# Patient Record
Sex: FEMALE | Race: WHITE | NOT HISPANIC OR LATINO
[De-identification: names, ages, dates, MRNs, and addresses within clinical notes are randomized per-mention and may not be internally consistent; named-entity substitution may affect disease eponyms.]

---

## 2020-03-16 PROBLEM — Z00.00 ENCOUNTER FOR PREVENTIVE HEALTH EXAMINATION: Status: ACTIVE | Noted: 2020-03-16

## 2020-03-20 ENCOUNTER — APPOINTMENT (OUTPATIENT)
Dept: PAIN MANAGEMENT | Facility: CLINIC | Age: 75
End: 2020-03-20
Payer: COMMERCIAL

## 2020-03-20 VITALS
HEIGHT: 64 IN | BODY MASS INDEX: 35.34 KG/M2 | SYSTOLIC BLOOD PRESSURE: 132 MMHG | DIASTOLIC BLOOD PRESSURE: 70 MMHG | WEIGHT: 207 LBS

## 2020-03-20 PROCEDURE — 99204 OFFICE O/P NEW MOD 45 MIN: CPT

## 2020-03-20 RX ORDER — CHLORTHALIDONE 25 MG/1
25 TABLET ORAL
Qty: 30 | Refills: 0 | Status: ACTIVE | COMMUNITY
Start: 2019-11-26

## 2020-03-20 RX ORDER — ASPIRIN 81 MG
81 TABLET, DELAYED RELEASE (ENTERIC COATED) ORAL
Refills: 0 | Status: ACTIVE | COMMUNITY

## 2020-03-20 RX ORDER — GABAPENTIN 300 MG/1
300 CAPSULE ORAL
Qty: 30 | Refills: 1 | Status: ACTIVE | COMMUNITY
Start: 2020-03-20 | End: 1900-01-01

## 2020-03-20 RX ORDER — IRBESARTAN 150 MG/1
150 TABLET ORAL
Qty: 30 | Refills: 0 | Status: ACTIVE | COMMUNITY
Start: 2019-11-26

## 2020-03-20 RX ORDER — ACETAMINOPHEN 325 MG/1
TABLET, FILM COATED ORAL
Refills: 0 | Status: ACTIVE | COMMUNITY

## 2020-03-20 RX ORDER — LETROZOLE TABLETS 2.5 MG/1
2.5 TABLET, FILM COATED ORAL
Qty: 30 | Refills: 0 | Status: ACTIVE | COMMUNITY
Start: 2020-02-04

## 2020-03-20 NOTE — PHYSICAL EXAM
[Normal muscle bulk without asymmetry] : normal muscle bulk without asymmetry [Spinous Process Tenderness] : spinous process tenderness [Facet Tenderness] : facet tenderness [Spine: Flexion to ___ degrees, without pain] : spine: flexion to [unfilled] degrees, without pain [] : Motor: [NL] : normal and symmetric bilaterally [de-identified] : Constitutional: Normal, well developed, no acute distress\par Eyes: Symmetric, External structures \par Oropharynx: Lips normal, symmetric, no external lesions appreciated\par Respiratory: Non-labored breathing, no audible wheezes\par Cardiac: Pulse palpated, no tachycardia\par Vascular: No cyanosis appreciated, no edema in bilateral lower extremities\par GI: Nondistended, no jaundice appreciated\par Neurovascular: CN2-12 grossly intact, Alert and oriented\par MSK: Normal muscle bulk, 5/5 Motor strength B/L in LE\par \par

## 2020-03-20 NOTE — REASON FOR VISIT
[Initial Consultation] : an initial pain management consultation [FreeTextEntry2] : referred by Dr. Carvajal for evaluation of back pain

## 2020-03-20 NOTE — CONSULT LETTER
[Dear  ___] : Dear  [unfilled], [Consult Letter:] : I had the pleasure of evaluating your patient, [unfilled]. [Please see my note below.] : Please see my note below. [Consult Closing:] : Thank you very much for allowing me to participate in the care of this patient.  If you have any questions, please do not hesitate to contact me. [Sincerely,] : Sincerely, [FreeTextEntry3] : \par SEAMUS Hand DO\par Director, Pain Management Center\par  of Anesthesiology\par [facility] [employer] [facility]\par \par \par

## 2020-03-20 NOTE — HISTORY OF PRESENT ILLNESS
[Back Pain] : back pain [Sharp] : sharp [Shooting] : shooting [Electric] : electric [Standing] : standing [Bending] : bending [Medications] : medications [8] : 3. What number best describes how, during the past week, pain has interfered with your general activity? 8/10 pain [FreeTextEntry1] : HPI\par \par Ms. MONSE JEAN is a 75 year F with pmhx osteopenia, breast ca on letrozole, bilateral TKR, on asa 81mg for primary prophylaxis, presents with bilateral mid back pain that radiates to bilateral subcostal region, intermittent. that started 2 months ago after tripping over the cat and fall on her back.  Pain is worse at night when turning at night. denies any worsening numbness, weakness, bowel/bladder dysfunction\par \par Previous and current pain medications/doses/effects:\par \par Tylenol with mild improvement\par \par Previous Pain Treatments:\par \par PT with mild improvement\par \par Previous Pain Injections:\par \par n/a\par \par Previous Diagnostic Studies/Images:\par \par n/a\par \par  [de-identified] : grpping [FreeTextEntry3] : turning  [FreeTextEntry2] : 24

## 2020-03-20 NOTE — ASSESSMENT
[FreeTextEntry1] : Mid and lower back pain in patient with hx of osteopenia and breast ca after fall that is refractory to conservative treatments, will obtain MRI TS and LS w wo IVC to evaluate for pathology - concern for vcf\par \par may consider repeat PT pending intervention\par \par gabapentin 300mg nightly\par \par \par The above diagnosis and treatment plan is medically reasonable and necessary based on the patient encounter.\par Opiod contract signed,\par There were no barriers to communication.\par Informed patient that I would be available for any additional questions.\par Patient was instructed to call with any worsening symptoms including severe pain, new numbness/weakness, or changes in the bowel/bladder function. \par Regarding opiate medication to manage pain. I had a detailed discussion with the patient regarding the risks of long-term opioid use, including the potential for medication side effects, hyperaglesia, endocrine dysfunction, addiction, tolerance, constipation, among others. Discussed relevant risks. \par \par Discussed role of nsaids in pain management and all relevant risks, if patient is continuing to require after 4 weeks the patient should f/u for alternative treatment. \par \par Instructed patient to maintain pain diary to monitor pain level, mobility, and function.\par \par The referring provider was informed of the above diagnosis and treatment plan.\par

## 2020-03-20 NOTE — REVIEW OF SYSTEMS
[Urinary Frequency] : urinary frequency [Back Pain] : back pain [Joint Pain] : joint pain [Negative] : Heme/Lymph

## 2020-03-25 ENCOUNTER — TRANSCRIPTION ENCOUNTER (OUTPATIENT)
Age: 75
End: 2020-03-25

## 2020-03-26 ENCOUNTER — RESULT REVIEW (OUTPATIENT)
Age: 75
End: 2020-03-26

## 2020-03-31 ENCOUNTER — APPOINTMENT (OUTPATIENT)
Dept: PAIN MANAGEMENT | Facility: CLINIC | Age: 75
End: 2020-03-31
Payer: COMMERCIAL

## 2020-03-31 ENCOUNTER — RECORD ABSTRACTING (OUTPATIENT)
Age: 75
End: 2020-03-31

## 2020-03-31 DIAGNOSIS — Z78.9 OTHER SPECIFIED HEALTH STATUS: ICD-10-CM

## 2020-03-31 DIAGNOSIS — M19.90 UNSPECIFIED OSTEOARTHRITIS, UNSPECIFIED SITE: ICD-10-CM

## 2020-03-31 DIAGNOSIS — K29.70 GASTRITIS, UNSPECIFIED, W/OUT BLEEDING: ICD-10-CM

## 2020-03-31 DIAGNOSIS — Z87.2 PERSONAL HISTORY OF DISEASES OF THE SKIN AND SUBCUTANEOUS TISSUE: ICD-10-CM

## 2020-03-31 DIAGNOSIS — Z82.0 FAMILY HISTORY OF EPILEPSY AND OTHER DISEASES OF THE NERVOUS SYSTEM: ICD-10-CM

## 2020-03-31 DIAGNOSIS — Z80.3 FAMILY HISTORY OF MALIGNANT NEOPLASM OF BREAST: ICD-10-CM

## 2020-03-31 DIAGNOSIS — E78.5 HYPERLIPIDEMIA, UNSPECIFIED: ICD-10-CM

## 2020-03-31 DIAGNOSIS — I10 ESSENTIAL (PRIMARY) HYPERTENSION: ICD-10-CM

## 2020-03-31 DIAGNOSIS — Z80.0 FAMILY HISTORY OF MALIGNANT NEOPLASM OF DIGESTIVE ORGANS: ICD-10-CM

## 2020-03-31 DIAGNOSIS — Z85.3 PERSONAL HISTORY OF MALIGNANT NEOPLASM OF BREAST: ICD-10-CM

## 2020-03-31 PROCEDURE — 99213 OFFICE O/P EST LOW 20 MIN: CPT

## 2020-03-31 RX ORDER — OMEPRAZOLE 20 MG/1
20 CAPSULE, DELAYED RELEASE ORAL
Qty: 30 | Refills: 0 | Status: ACTIVE | COMMUNITY
Start: 2019-12-17

## 2020-03-31 RX ORDER — AZITHROMYCIN 250 MG/1
250 TABLET, FILM COATED ORAL
Qty: 6 | Refills: 0 | Status: ACTIVE | COMMUNITY
Start: 2019-10-03

## 2020-03-31 RX ORDER — ASPIRIN 325 MG/1
325 TABLET, FILM COATED ORAL TWICE DAILY
Refills: 0 | Status: ACTIVE | COMMUNITY

## 2020-03-31 RX ORDER — PANTOPRAZOLE 40 MG/1
40 TABLET, DELAYED RELEASE ORAL DAILY
Refills: 0 | Status: ACTIVE | COMMUNITY

## 2020-03-31 RX ORDER — METAXALONE 800 MG/1
800 TABLET ORAL
Qty: 30 | Refills: 0 | Status: ACTIVE | COMMUNITY
Start: 2019-12-17

## 2020-03-31 RX ORDER — MELOXICAM 7.5 MG/1
7.5 TABLET ORAL
Qty: 30 | Refills: 0 | Status: ACTIVE | COMMUNITY
Start: 2019-12-19

## 2020-03-31 NOTE — PHYSICAL EXAM
[Normal muscle bulk without asymmetry] : normal muscle bulk without asymmetry [Normal] : Normal affect

## 2020-03-31 NOTE — HISTORY OF PRESENT ILLNESS
[Back Pain] : back pain [8] : a maximum pain level of 8/10 [Sharp] : sharp [Shooting] : shooting [Electric] : electric [Standing] : standing [Bending] : bending [Medications] : medications [Home] : at home, [unfilled] , at the time of the visit. [Medical Office: ([facility])___] : at ~his/her~ medical office located in [place] [Family Member] : family member [Patient] : the patient [FreeTextEntry1] : Interval Note:\par \par Since last visit the pain is improved.  Patient tolerating gabapentin at night.  States that the pain is improved during the day.  Able to perform adls.  Denies any additional weakness, numbness, bowel/bladder dysfunction. \par \par \par HPI\par \par Ms. MONSE JEAN is a 75 year F with pmhx osteopenia, breast ca on letrozole, bilateral TKR, on asa 81mg for primary prophylaxis, presents with bilateral mid back pain that radiates to bilateral subcostal region, intermittent. that started 2 months ago after tripping over the cat and fall on her back.  Pain is worse at night when turning at night. denies any worsening numbness, weakness, bowel/bladder dysfunction\par \par Previous and current pain medications/doses/effects:\par \par Tylenol with mild improvement\par \par Previous Pain Treatments:\par \par PT with mild improvement\par \par Previous Pain Injections:\par \par n/a\par \par Previous Diagnostic Studies/Images:\par \par MRI TS 3/20\par \par  The MRI examination demonstrates mild loss of height with wedge shaped deformity\par involving T11 and vertebral bodies. There is hypointense T1 fracture line endplate and superior\par portion of the T1 vertebral body. This does not appear to extend into the posterior elements. There\par is mild retropulsion of the superior endplate. There is hyperintense T2/STIR signal abnormality\par along the superior endplate. No abnormal enhancement is identified within the vertebral body. These\par findings may represent subacute compression fracture.\par \par  There is more pronounced hypointense T1 signal and hyperintense T2/STIR signal involving the\par majority of the L1 vertebral body compatible with edema. No definite fracture line is visualized.\par There is mild pre and paravertebral soft tissue fullness. There is no significant retropulsion of\par the superior endplate.  No abnormal enhancement is identified within the vertebral body. These\par findings may represent acute compression fracture.\par \par  The thoracic alignment is intact. The rest of the vertebral body heights are maintained. There are\par anterior bridging osteophytes at T4/5-T5/6. There is hypointense T1 and hyperintense T2 STIR signal\par along the anterior margins of the T7/8 and T8/9 endplates which demonstrates enhancement and is\par compatible with Type I Modic degenerative endplate changes. There are well-circumscribed foci of\par hyperintense T1 and T2 signal within the T4, T5, T6 and T9 vertebral bodies compatible with\par hemangiomas. The rest of the vertebral body marrow signal is appropriate for the patient's stated\par age. No abnormal signal is identified within the thoracic cord. The conus medullaris ends at the\par T12/L1 level.\par \par  At the T7/8, T8/9 and T 9/10 levels, there are minimum disc bulges. There is no spinal canal\par stenosis.\par \par  At the T10/11 level, there is a large right paracentral disc herniation with extruded disc material\par extending superiorly and inferiorly above the endplates and indenting the ventrolateral aspect of\par the thecal sac and moderately compressing the spinal cord. The extruded disc also compresses the\par right T10 nerve root and correlation with the patient's clinical symptoms is recommended.\par \par  At the T11/12 level, there is a moderate-sized right paracentral disc herniation indenting on the\par thecal sac without compressing the spinal cord or nerve root.\par \par  At the T12/L1 level, there is minimum disc bulge. There are degenerative changes involving the\par facets bilaterally. There is no spinal canal stenosis.\par \par \par  IMPRESSION: Subacute compression fracture deformity of T11 and acute compression fracture deformity\par L1. Large right paracentral disc herniation at T10/11 moderately compressing the spinal cord as well\par as compressing the right T10 nerve root and correlation with the patient's clinical symptoms is\par recommended. Moderate size right paracentral disc herniation at T11/12 without cord compression.\par \par MRI LS 3/2020\par  There is multilevel disc degeneration with loss of normal high signal\par in intervertebral discs on the sagittal T2-weighted sequence. There are anterior vertebral body\par osteophytes, spondylosis deformans.\par \par  FRACTURE: There is a mild, recent compression fracture in the superior endplate of L1. It is a\par recent fracture because there is edema in the vertebral body bone marrow. Edema extends slightly\par into posterior elements. Fracture line in the superior endplate extends to the dorsal aspect of the\par vertebral body, but there is no retropulsion of bony material into the spinal canal. There is no\par evidence of underlying lesion. See separate report thoracic MRI regarding T11 fracture.\par \par  FINDINGS AT SPECIFIC LEVELS:\par \par      L5-S1: Marked facet osteoarthritis. Mild disc thinning. Mild disc bulging. Moderate right\par foramen narrowing.\par \par      L4-L5: Marked left facet osteoarthritis. Moderate right facet osteoarthritis. Thickening of the\par ligamentum flavum. First degree degenerative anterolisthesis. Marked disc thinning. Mild disc\par bulging. Mild central spinal stenosis. Moderate left subarticular zone stenosis. Mild right\par subarticular zone stenosis. Moderate left foramen narrowing. Mild right foramen narrowing.\par \par      L3-L4: Mild facet osteoarthritis. Thickening of the ligamentum flavum. Mild disc thinning. Mild\par disc bulging. Mild right foramen narrowing.\par \par      L2-L3: Mild facet osteoarthritis. Thickening of the ligamentum flavum. Marked disc thinning.\par Mild disc bulge extends more to the right than the left. Mild compression of the right L2 nerve root\par at the lateral aspect of the neural foramen related to asymmetric disc bulge.\par \par      L1-L2: Mild facet osteoarthritis. Thickening of the ligamentum flavum. Diffuse disc bulging.\par Prominent dorsal epidural fat. Mild central spinal stenosis. Mild subarticular zone stenosis.\par \par      T12-L1: Mild facet osteoarthritis. Thickening of the ligamentum flavum. Mild disc bulge.\par \par  INTRADURAL SPACE AND CONUS MEDULLARIS: No tethering of the spinal cord. Incidental filar lipoma. No\par intradural mass is demonstrated.\par \par  BONE MARROW SIGNAL: Edema related to recent L1 compression fracture. No evidence of malignant\par neoplasm replacing vertebral body bone marrow. No evidence of osteomyelitis/discitis.\par \par  ALIGNMENT: Degenerative anterolisthesis L4-L5. Mild thoracolumbar levoscoliosis.\par \par  PARASPINAL SOFT TISSUES: There is no paraspinal mass. There is a small right lobe liver lesion near\par the upper pole the right kidney. While the finding is not fully characterized on lumbar MRI, lesion\par is consistent with a cyst or hemangioma.\par \par \par  IMPRESSION: Mild recent compression fracture at the superior endplate of L1. No evidence of\par underlying neoplastic or infectious lesion. See separate report thoracic MRI regarding T11 fracture.\par \par  Multiple levels of stenosis (central, subarticular, foraminal) as described above\par \par  [de-identified] : grpping [FreeTextEntry3] : turning

## 2020-03-31 NOTE — ASSESSMENT
[FreeTextEntry1] : I personally reviewed the relevant imaging.  Discussed and explained to patient the likely source of pathology and pain.  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 Torres for treatment of osteoporosis\par \par There were no barriers to communication.\par Informed patient that I would be available for any additional questions.\par Patient was instructed to call with any worsening symptoms including severe pain, new numbness/weakness, or changes in the bowel/bladder function. \par \par Discussed role of nsaids in pain management and all relevant risks, if patient is continuing to require after 4 weeks the patient should f/u for alternative treatment. \par \par Instructed patient to maintain pain diary to monitor pain level, mobility, and function.\par \par \par

## 2020-05-01 ENCOUNTER — APPOINTMENT (OUTPATIENT)
Dept: PAIN MANAGEMENT | Facility: CLINIC | Age: 75
End: 2020-05-01
Payer: COMMERCIAL

## 2020-05-01 VITALS
SYSTOLIC BLOOD PRESSURE: 120 MMHG | TEMPERATURE: 97.8 F | HEIGHT: 64 IN | DIASTOLIC BLOOD PRESSURE: 72 MMHG | WEIGHT: 206 LBS | BODY MASS INDEX: 35.17 KG/M2

## 2020-05-01 PROCEDURE — 99214 OFFICE O/P EST MOD 30 MIN: CPT

## 2020-05-01 NOTE — HISTORY OF PRESENT ILLNESS
[8] : a maximum pain level of 8/10 [Back Pain] : back pain [Sharp] : sharp [Shooting] : shooting [Electric] : electric [Standing] : standing [Bending] : bending [Medications] : medications [FreeTextEntry1] : Interval Note:\par \par Since last visit the pain is improved.  Patient tolerating gabapentin at night.  States that the pain is improved during the day.  Able to perform adls. She has seen a endocrinologist who is planning for her to receive reclast for osteporosis.  She is considering meloxicam, however is still hesitant to take it for her pain.  She has not staarted PT as of yet because of limited scheduling secondary to pandemic. Denies any additional weakness, numbness, bowel/bladder dysfunction. \par \par \par HPI\par \par Ms. MONSE JEAN is a 75 year F with pmhx osteopenia, breast ca on letrozole, bilateral TKR, on asa 81mg for primary prophylaxis, presents with bilateral mid back pain that radiates to bilateral subcostal region, intermittent. that started 2 months ago after tripping over the cat and fall on her back.  Pain is worse at night when turning at night. denies any worsening numbness, weakness, bowel/bladder dysfunction\par \par Previous and current pain medications/doses/effects:\par \par Tylenol with mild improvement\par \par Previous Pain Treatments:\par \par PT with mild improvement\par \par Previous Pain Injections:\par \par n/a\par \par Previous Diagnostic Studies/Images:\par \par MRI TS 3/20\par \par  The MRI examination demonstrates mild loss of height with wedge shaped deformity\par involving T11 and vertebral bodies. There is hypointense T1 fracture line endplate and superior\par portion of the T1 vertebral body. This does not appear to extend into the posterior elements. There\par is mild retropulsion of the superior endplate. There is hyperintense T2/STIR signal abnormality\par along the superior endplate. No abnormal enhancement is identified within the vertebral body. These\par findings may represent subacute compression fracture.\par \par  There is more pronounced hypointense T1 signal and hyperintense T2/STIR signal involving the\par majority of the L1 vertebral body compatible with edema. No definite fracture line is visualized.\par There is mild pre and paravertebral soft tissue fullness. There is no significant retropulsion of\par the superior endplate.  No abnormal enhancement is identified within the vertebral body. These\par findings may represent acute compression fracture.\par \par  The thoracic alignment is intact. The rest of the vertebral body heights are maintained. There are\par anterior bridging osteophytes at T4/5-T5/6. There is hypointense T1 and hyperintense T2 STIR signal\par along the anterior margins of the T7/8 and T8/9 endplates which demonstrates enhancement and is\par compatible with Type I Modic degenerative endplate changes. There are well-circumscribed foci of\par hyperintense T1 and T2 signal within the T4, T5, T6 and T9 vertebral bodies compatible with\par hemangiomas. The rest of the vertebral body marrow signal is appropriate for the patient's stated\par age. No abnormal signal is identified within the thoracic cord. The conus medullaris ends at the\par T12/L1 level.\par \par  At the T7/8, T8/9 and T 9/10 levels, there are minimum disc bulges. There is no spinal canal\par stenosis.\par \par  At the T10/11 level, there is a large right paracentral disc herniation with extruded disc material\par extending superiorly and inferiorly above the endplates and indenting the ventrolateral aspect of\par the thecal sac and moderately compressing the spinal cord. The extruded disc also compresses the\par right T10 nerve root and correlation with the patient's clinical symptoms is recommended.\par \par  At the T11/12 level, there is a moderate-sized right paracentral disc herniation indenting on the\par thecal sac without compressing the spinal cord or nerve root.\par \par  At the T12/L1 level, there is minimum disc bulge. There are degenerative changes involving the\par facets bilaterally. There is no spinal canal stenosis.\par \par \par  IMPRESSION: Subacute compression fracture deformity of T11 and acute compression fracture deformity\par L1. Large right paracentral disc herniation at T10/11 moderately compressing the spinal cord as well\par as compressing the right T10 nerve root and correlation with the patient's clinical symptoms is\par recommended. Moderate size right paracentral disc herniation at T11/12 without cord compression.\par \par MRI LS 3/2020\par  There is multilevel disc degeneration with loss of normal high signal\par in intervertebral discs on the sagittal T2-weighted sequence. There are anterior vertebral body\par osteophytes, spondylosis deformans.\par \par  FRACTURE: There is a mild, recent compression fracture in the superior endplate of L1. It is a\par recent fracture because there is edema in the vertebral body bone marrow. Edema extends slightly\par into posterior elements. Fracture line in the superior endplate extends to the dorsal aspect of the\par vertebral body, but there is no retropulsion of bony material into the spinal canal. There is no\par evidence of underlying lesion. See separate report thoracic MRI regarding T11 fracture.\par \par  FINDINGS AT SPECIFIC LEVELS:\par \par      L5-S1: Marked facet osteoarthritis. Mild disc thinning. Mild disc bulging. Moderate right\par foramen narrowing.\par \par      L4-L5: Marked left facet osteoarthritis. Moderate right facet osteoarthritis. Thickening of the\par ligamentum flavum. First degree degenerative anterolisthesis. Marked disc thinning. Mild disc\par bulging. Mild central spinal stenosis. Moderate left subarticular zone stenosis. Mild right\par subarticular zone stenosis. Moderate left foramen narrowing. Mild right foramen narrowing.\par \par      L3-L4: Mild facet osteoarthritis. Thickening of the ligamentum flavum. Mild disc thinning. Mild\par disc bulging. Mild right foramen narrowing.\par \par      L2-L3: Mild facet osteoarthritis. Thickening of the ligamentum flavum. Marked disc thinning.\par Mild disc bulge extends more to the right than the left. Mild compression of the right L2 nerve root\par at the lateral aspect of the neural foramen related to asymmetric disc bulge.\par \par      L1-L2: Mild facet osteoarthritis. Thickening of the ligamentum flavum. Diffuse disc bulging.\par Prominent dorsal epidural fat. Mild central spinal stenosis. Mild subarticular zone stenosis.\par \par      T12-L1: Mild facet osteoarthritis. Thickening of the ligamentum flavum. Mild disc bulge.\par \par  INTRADURAL SPACE AND CONUS MEDULLARIS: No tethering of the spinal cord. Incidental filar lipoma. No\par intradural mass is demonstrated.\par \par  BONE MARROW SIGNAL: Edema related to recent L1 compression fracture. No evidence of malignant\par neoplasm replacing vertebral body bone marrow. No evidence of osteomyelitis/discitis.\par \par  ALIGNMENT: Degenerative anterolisthesis L4-L5. Mild thoracolumbar levoscoliosis.\par \par  PARASPINAL SOFT TISSUES: There is no paraspinal mass. There is a small right lobe liver lesion near\par the upper pole the right kidney. While the finding is not fully characterized on lumbar MRI, lesion\par is consistent with a cyst or hemangioma.\par \par \par  IMPRESSION: Mild recent compression fracture at the superior endplate of L1. No evidence of\par underlying neoplastic or infectious lesion. See separate report thoracic MRI regarding T11 fracture.\par \par  Multiple levels of stenosis (central, subarticular, foraminal) as described above\par \par  [de-identified] : grpping [FreeTextEntry3] : turning

## 2020-05-01 NOTE — ASSESSMENT
[FreeTextEntry1] : \par conservative treatment for VCF in patient without significant debilitation\par \par plan to start PT when able - referral provided\par \par uptitrate gabapentin to 300mg TID\par \par may consider gerardo for thoracic radiculopathy\par \par f/u with endocrinology for treatment of osteoporosis\par \par There were no barriers to communication.\par Informed patient that I would be available for any additional questions.\par Patient was instructed to call with any worsening symptoms including severe pain, new numbness/weakness, or changes in the bowel/bladder function. \par \par Discussed role of nsaids in pain management and all relevant risks, if patient is continuing to require after 4 weeks the patient should f/u for alternative treatment. \par \par Instructed patient to maintain pain diary to monitor pain level, mobility, and function.\par \par \par

## 2020-05-01 NOTE — PHYSICAL EXAM
[Spinous Process Tenderness] : spinous process tenderness [Normal muscle bulk without asymmetry] : normal muscle bulk without asymmetry [] : Motor: [Within functional limits and without pain] : within functional limits and without pain [NL] : normal and symmetric bilaterally [Normal] : Normal affect

## 2020-06-05 ENCOUNTER — APPOINTMENT (OUTPATIENT)
Dept: PAIN MANAGEMENT | Facility: CLINIC | Age: 75
End: 2020-06-05
Payer: COMMERCIAL

## 2020-06-05 DIAGNOSIS — M79.18 MYALGIA, OTHER SITE: ICD-10-CM

## 2020-06-05 DIAGNOSIS — M54.14 RADICULOPATHY, THORACIC REGION: ICD-10-CM

## 2020-06-05 DIAGNOSIS — M47.814 SPONDYLOSIS W/OUT MYELOPATHY OR RADICULOPATHY, THORACIC REGION: ICD-10-CM

## 2020-06-05 DIAGNOSIS — M80.08XA AGE-RELATED OSTEOPOROSIS WITH CURRENT PATHOLOGICAL FRACTURE, VERTEBRA(E), INITIAL ENCOUNTER FOR FRACTURE: ICD-10-CM

## 2020-06-05 DIAGNOSIS — M79.2 NEURALGIA AND NEURITIS, UNSPECIFIED: ICD-10-CM

## 2020-06-05 DIAGNOSIS — M47.817 SPONDYLOSIS W/OUT MYELOPATHY OR RADICULOPATHY, LUMBOSACRAL REGION: ICD-10-CM

## 2020-06-05 PROCEDURE — 99214 OFFICE O/P EST MOD 30 MIN: CPT | Mod: 95

## 2020-06-05 NOTE — HISTORY OF PRESENT ILLNESS
[Back Pain] : back pain [8] : a maximum pain level of 8/10 [Sharp] : sharp [Shooting] : shooting [Electric] : electric [Standing] : standing [Bending] : bending [Medications] : medications [Home] : at home, [unfilled] , at the time of the visit. [Medical Office: ([facility])___] : at the medical office located in  [Verbal consent obtained from patient] : the patient, [unfilled] [FreeTextEntry1] : Interval Note:\par \par Since last visit the pain is improved.  Patient tolerating gabapentin at night.  States that the pain is improved during the day.  But she continues have significant pain over the mid back that radiates to bilateral epigastric region.  Pain is shocking when changing positions and severe.  denies any worsening numbness, weakness, bowel/bladder dysfunction\par \par \par \par HPI\par \par Ms. MONSE JEAN is a 75 year F with pmhx osteopenia, breast ca on letrozole, bilateral TKR, on asa 81mg for primary prophylaxis, presents with bilateral mid back pain that radiates to bilateral subcostal region, intermittent. that started 2 months ago after tripping over the cat and fall on her back.  Pain is worse at night when turning at night. denies any worsening numbness, weakness, bowel/bladder dysfunction\par \par Previous and current pain medications/doses/effects:\par \par Tylenol with mild improvement\par \par Previous Pain Treatments:\par \par PT with mild improvement\par \par Previous Pain Injections:\par \par n/a\par \par Previous Diagnostic Studies/Images:\par \par MRI TS 3/20\par \par  The MRI examination demonstrates mild loss of height with wedge shaped deformity\par involving T11 and vertebral bodies. There is hypointense T1 fracture line endplate and superior\par portion of the T1 vertebral body. This does not appear to extend into the posterior elements. There\par is mild retropulsion of the superior endplate. There is hyperintense T2/STIR signal abnormality\par along the superior endplate. No abnormal enhancement is identified within the vertebral body. These\par findings may represent subacute compression fracture.\par \par  There is more pronounced hypointense T1 signal and hyperintense T2/STIR signal involving the\par majority of the L1 vertebral body compatible with edema. No definite fracture line is visualized.\par There is mild pre and paravertebral soft tissue fullness. There is no significant retropulsion of\par the superior endplate.  No abnormal enhancement is identified within the vertebral body. These\par findings may represent acute compression fracture.\par \par  The thoracic alignment is intact. The rest of the vertebral body heights are maintained. There are\par anterior bridging osteophytes at T4/5-T5/6. There is hypointense T1 and hyperintense T2 STIR signal\par along the anterior margins of the T7/8 and T8/9 endplates which demonstrates enhancement and is\par compatible with Type I Modic degenerative endplate changes. There are well-circumscribed foci of\par hyperintense T1 and T2 signal within the T4, T5, T6 and T9 vertebral bodies compatible with\par hemangiomas. The rest of the vertebral body marrow signal is appropriate for the patient's stated\par age. No abnormal signal is identified within the thoracic cord. The conus medullaris ends at the\par T12/L1 level.\par \par  At the T7/8, T8/9 and T 9/10 levels, there are minimum disc bulges. There is no spinal canal\par stenosis.\par \par  At the T10/11 level, there is a large right paracentral disc herniation with extruded disc material\par extending superiorly and inferiorly above the endplates and indenting the ventrolateral aspect of\par the thecal sac and moderately compressing the spinal cord. The extruded disc also compresses the\par right T10 nerve root and correlation with the patient's clinical symptoms is recommended.\par \par  At the T11/12 level, there is a moderate-sized right paracentral disc herniation indenting on the\par thecal sac without compressing the spinal cord or nerve root.\par \par  At the T12/L1 level, there is minimum disc bulge. There are degenerative changes involving the\par facets bilaterally. There is no spinal canal stenosis.\par \par \par  IMPRESSION: Subacute compression fracture deformity of T11 and acute compression fracture deformity\par L1. Large right paracentral disc herniation at T10/11 moderately compressing the spinal cord as well\par as compressing the right T10 nerve root and correlation with the patient's clinical symptoms is\par recommended. Moderate size right paracentral disc herniation at T11/12 without cord compression.\par \par MRI LS 3/2020\par  There is multilevel disc degeneration with loss of normal high signal\par in intervertebral discs on the sagittal T2-weighted sequence. There are anterior vertebral body\par osteophytes, spondylosis deformans.\par \par  FRACTURE: There is a mild, recent compression fracture in the superior endplate of L1. It is a\par recent fracture because there is edema in the vertebral body bone marrow. Edema extends slightly\par into posterior elements. Fracture line in the superior endplate extends to the dorsal aspect of the\par vertebral body, but there is no retropulsion of bony material into the spinal canal. There is no\par evidence of underlying lesion. See separate report thoracic MRI regarding T11 fracture.\par \par  FINDINGS AT SPECIFIC LEVELS:\par \par      L5-S1: Marked facet osteoarthritis. Mild disc thinning. Mild disc bulging. Moderate right\par foramen narrowing.\par \par      L4-L5: Marked left facet osteoarthritis. Moderate right facet osteoarthritis. Thickening of the\par ligamentum flavum. First degree degenerative anterolisthesis. Marked disc thinning. Mild disc\par bulging. Mild central spinal stenosis. Moderate left subarticular zone stenosis. Mild right\par subarticular zone stenosis. Moderate left foramen narrowing. Mild right foramen narrowing.\par \par      L3-L4: Mild facet osteoarthritis. Thickening of the ligamentum flavum. Mild disc thinning. Mild\par disc bulging. Mild right foramen narrowing.\par \par      L2-L3: Mild facet osteoarthritis. Thickening of the ligamentum flavum. Marked disc thinning.\par Mild disc bulge extends more to the right than the left. Mild compression of the right L2 nerve root\par at the lateral aspect of the neural foramen related to asymmetric disc bulge.\par \par      L1-L2: Mild facet osteoarthritis. Thickening of the ligamentum flavum. Diffuse disc bulging.\par Prominent dorsal epidural fat. Mild central spinal stenosis. Mild subarticular zone stenosis.\par \par      T12-L1: Mild facet osteoarthritis. Thickening of the ligamentum flavum. Mild disc bulge.\par \par  INTRADURAL SPACE AND CONUS MEDULLARIS: No tethering of the spinal cord. Incidental filar lipoma. No\par intradural mass is demonstrated.\par \par  BONE MARROW SIGNAL: Edema related to recent L1 compression fracture. No evidence of malignant\par neoplasm replacing vertebral body bone marrow. No evidence of osteomyelitis/discitis.\par \par  ALIGNMENT: Degenerative anterolisthesis L4-L5. Mild thoracolumbar levoscoliosis.\par \par  PARASPINAL SOFT TISSUES: There is no paraspinal mass. There is a small right lobe liver lesion near\par the upper pole the right kidney. While the finding is not fully characterized on lumbar MRI, lesion\par is consistent with a cyst or hemangioma.\par \par \par  IMPRESSION: Mild recent compression fracture at the superior endplate of L1. No evidence of\par underlying neoplastic or infectious lesion. See separate report thoracic MRI regarding T11 fracture.\par \par  Multiple levels of stenosis (central, subarticular, foraminal) as described above\par \par  [de-identified] : grpping [FreeTextEntry3] : turning

## 2020-06-05 NOTE — PHYSICAL EXAM
[Normal] : Normal affect [Normal muscle bulk without asymmetry] : normal muscle bulk without asymmetry

## 2020-06-05 NOTE — ASSESSMENT
[FreeTextEntry1] : \par conservative treatment for VCF in patient without significant debilitation\par \par plan to start PT when able - referral provided\par \par gabapentin to 300mg nightly\par \par Significant component of back and leg pain likely secondary to thoracic spinal stenosis demonstrated on MRI TS caused by subacute - now chronic VCF, Will schedule T11-12 interlaminar epidural steroid injection (T10-11 moderate stenosi) r/b/a discussed\par \par informed patient of risks of steroid administration including transient worsening of blood glucose, htn, mood changes, progressive osteoporosis.  Encouraged to call with questions and concerns.\par \par hold asa 81mg primary prophylaxis for 7 days prior to intervention\par \par ASIPP COVID Morbidity risk stratification:\par \par Age 3\par Pulmonary 0\par CVS 0\par Obesity 0\par DM 0\par Renal 0 \par Hepatic 0\par Immune 0\par \par 2  low risk for covid related morbidity -  discussed r/b/a, patient wishes to proceed\par \par Will schedule above interventional pain procedure because further delay may cause harm or negative outcome to patient (c/w [place] regulations and CMS Priority Category 3).  The goal in performing this procedure is to avoid deterioration of function, emergency room visits (which increases exposure) and reliance on opioids.  \par \par r/b/a discussed with patient, lack of evidence to conclusively determine whether pain management procedures have any positive or negative impact on the possibility of jsutus the virus and/or development of any sequelae. \par \par Informed patient that risks associated with the COVID-19 infection.  Informed patient steps taken to limit the risks.  We are implementing safety precautions and following protocols consistent with the CDC and state recommendations. All patients and staff will be checked for fever or signs of illness upon entry to the facility. We will limit our steroid dose to the lowest effective therapeutic dose or in some cases steroids will not be injected at all. \par \par Patient agrees to proceed\par \par f/u with endocrinology for treatment of osteoporosis\par \par There were no barriers to communication.\par Informed patient that I would be available for any additional questions.\par Patient was instructed to call with any worsening symptoms including severe pain, new numbness/weakness, or changes in the bowel/bladder function. \par \par Discussed role of nsaids in pain management and all relevant risks, if patient is continuing to require after 4 weeks the patient should f/u for alternative treatment. \par \par Instructed patient to maintain pain diary to monitor pain level, mobility, and function.\par \par \par

## 2020-08-21 ENCOUNTER — RX RENEWAL (OUTPATIENT)
Age: 75
End: 2020-08-21

## 2020-08-21 RX ORDER — GABAPENTIN 300 MG/1
300 CAPSULE ORAL
Qty: 90 | Refills: 0 | Status: ACTIVE | COMMUNITY
Start: 2020-05-01 | End: 1900-01-01

## 2023-05-18 ENCOUNTER — NON-APPOINTMENT (OUTPATIENT)
Age: 78
End: 2023-05-18

## 2023-11-07 ENCOUNTER — NON-APPOINTMENT (OUTPATIENT)
Age: 78
End: 2023-11-07

## 2024-02-22 ENCOUNTER — NON-APPOINTMENT (OUTPATIENT)
Age: 79
End: 2024-02-22